# Patient Record
Sex: MALE | Race: WHITE
[De-identification: names, ages, dates, MRNs, and addresses within clinical notes are randomized per-mention and may not be internally consistent; named-entity substitution may affect disease eponyms.]

---

## 2018-07-22 ENCOUNTER — HOSPITAL ENCOUNTER (OUTPATIENT)
Dept: HOSPITAL 7 - FB.SDS | Age: 38
Discharge: HOME | End: 2018-07-22
Payer: COMMERCIAL

## 2018-07-22 ENCOUNTER — HOSPITAL ENCOUNTER (EMERGENCY)
Dept: HOSPITAL 52 - LL.ED | Age: 38
Discharge: NURSING, REHAB OR LONG-TERM CARE | End: 2018-07-22
Payer: COMMERCIAL

## 2018-07-22 VITALS — SYSTOLIC BLOOD PRESSURE: 130 MMHG | DIASTOLIC BLOOD PRESSURE: 83 MMHG

## 2018-07-22 DIAGNOSIS — Z91.010: ICD-10-CM
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
## 2018-07-22 DIAGNOSIS — T18.128A: Primary | ICD-10-CM

## 2018-07-22 DIAGNOSIS — K21.9: ICD-10-CM

## 2018-07-22 DIAGNOSIS — K22.2: ICD-10-CM

## 2018-07-22 DIAGNOSIS — Z90.49: ICD-10-CM

## 2018-07-22 NOTE — EDM.PDOC
ED HPI GENERAL MEDICAL PROBLEM





- General


Chief Complaint: ENT Problem


Stated Complaint: FOREIGN BODY IN THROAT


Time Seen by Provider: 07/22/18 09:55


Source of Information: Reports: Patient


History Limitations: Reports: No Limitations





- History of Present Illness


INITIAL COMMENTS - FREE TEXT/NARRATIVE: 





Patient comes to ER complaining of piece of steak lodged in esophagus since 

supper last night.  Unable to swallow spit/drink water. Tried to vomit it up 

last night but it did not help.  No SOB.  No feeling of airway obstruction.  

Uncomfortable, but no report of sharp pain.  Denies history of similar issues 

in past.


No other complaints. 





- Related Data


 Allergies











Allergy/AdvReac Type Severity Reaction Status Date / Time


 


nut - unspecified Allergy  Anaphylactic Verified 07/22/18 10:04





   Shock  


 


peanut Allergy  Other Verified 10/05/15 19:15











Home Meds: 


 Home Meds





. [No Known Home Meds]  07/22/18 [History]











Past Medical History





- Past Health History


Medical/Surgical History: Denies Medical/Surgical History


HEENT History: Reports: Other (See Below)


Other HEENT History: Hx of foreign body stuck in esophagus


Gastrointestinal History: Reports: GERD, Other (See Below) (Esophageal stricture

)


Genitourinary History: Reports: Other (See Below)


Other Genitourinary History: 1 functioning kidney, 1 removed per pt.





- Past Surgical History


GI Surgical History: Reports: Appendectomy





Social & Family History





- Tobacco Use


Smoking Status *Q: Never Smoker


Second Hand Smoke Exposure: No





- Caffeine Use


Caffeine Use: Reports: Coffee





ED ROS GENERAL





- Review of Systems


Review Of Systems: ROS reveals no pertinent complaints other than HPI.





ED EXAM, GI/ABD





- Physical Exam


Exam: See Below


Exam Limited By: No Limitations


General Appearance: Alert, WD/WN, Other (mildly uncomfortable in appearance but 

no acute distress)


Eyes: Bilateral: Normal Appearance, EOMI


Ears: Normal External Exam


Nose: No: Nasal Deformity, Nasal Swelling, Nasal Drainage


Throat/Mouth: Normal Inspection, Normal Voice, No Airway Compromise


Head: Atraumatic, Normocephalic


Neck: Supple


Respiratory/Chest: No Respiratory Distress, Lungs Clear, Normal Breath Sounds, 

No Accessory Muscle Use


Cardiovascular: Regular Rate, Rhythm


GI/Abdominal Exam: Soft, Non-Tender


 (Male) Exam: Deferred


Rectal (Males) Exam: Deferred


Neurological: Alert, Oriented, Normal Cognition, Normal Gait, No Motor/Sensory 

Deficits


Psychiatric: Normal Affect, Normal Mood


Skin Exam: Warm, Dry, Intact, Normal Color





Course





- Vital Signs


Last Recorded V/S: 


 Last Vital Signs











Temp  36.8 C   07/22/18 09:53


 


Pulse  85   07/22/18 09:53


 


Resp  16   07/22/18 09:53


 


BP  139/93 H  07/22/18 09:53


 


Pulse Ox  97   07/22/18 09:53














- Orders/Labs/Meds


Orders: 


 Active Orders 24 hr











 Category Date Time Status


 


 Chest 2V [CR] Stat Exams  07/22/18 10:03 Ordered














- Radiology Interpretation


Free Text/Narrative:: 





No evidence of esophageal perforation noted on chest xray





- Re-Assessments/Exams


Free Text/Narrative Re-Assessment/Exam: 








 from [facility] was contacted regarding patient.  He 

is willing to see the patient at that facility and can endoscopically address 

the bolus of food stuck in the esophagus as well as look for suspected 

esophageal stricture that caused this event. 





Patient will be driven by his wife directly to [place] after discharge 

from our facility. Vital signs stable. Precautions reviewed prior to discharge. 





Departure





- Departure


Time of Disposition: 10:30


Disposition: DC/Tfer to Acute Hospital [facility]


Condition: Good


Clinical Impression: 


 Esophageal stricture, Esophageal obstruction due to food impaction








- Discharge Information


*PRESCRIPTION DRUG MONITORING PROGRAM REVIEWED*: Not Applicable


*COPY OF PRESCRIPTION DRUG MONITORING REPORT IN PATIENT PRIYANK: Not Applicable


Referrals: 


Ilana Guillermo PA [Primary Care Provider] - 


Forms:  ED Department Discharge


Additional Instructions: 


Drive directly to [facility]. 


Their surgeon, , will meet you there and will continue treatment 

for the piece of food that is stuck in your esophagus. Do not attempt to eat/

drink anything prior to being seen there. 





- My Orders


Last 24 Hours: 


My Active Orders





07/22/18 10:03


Chest 2V [CR] Stat 














- Assessment/Plan


Last 24 Hours: 


My Active Orders





07/22/18 10:03


Chest 2V [CR] Stat

## 2018-07-22 NOTE — PCM.OPNOTE
- General Post-Op/Procedure Note


Date of Surgery/Procedure: 07/22/18


Operative Procedure(s): EGD with removal of esophageal foreign body


Findings: 





Large amount of food (steak) lodged in esophagus 


No definite stricture seen


Pre Op Diagnosis: Foreign body in esophagus


Post-Op Diagnosis: Same


Anesthesia Technique: MAC


Primary Surgeon: Nito Lorenzo


Pathology: 





none


Output, Urine Amount: 0


EBL in mLs: 3


Complications: None


Condition: Good

## 2018-07-22 NOTE — HP
ADMISSION DATE:  07/22/2018

 

HISTORY OF PRESENT ILLNESS:  This is a 37-year-old male who is referred to this

facility today from the [facility].  He presented there with symptoms of

dysphagia after eating steak last night.  He was diagnosed with a foreign body

of the esophagus, which has persisted overnight and he comes here for definitive

management.  He currently is not having any abdominal pain but is unable to

swallow and retain even his saliva or water.  He says he was eating steak at

approximately 8 p.m. last night when he noticed a bite of steak lodged in his

esophagus and has not been able to swallow anything since then.  He denies any

abdominal pain.  He denies any history of heartburn, although does state that

over the last few weeks he has been noticing some increased difficulty with

swallowing larger pieces of bread.  These have all eventually passed and he has

not had to have any previous procedures for any similar symptoms.

 

PAST MEDICAL HISTORY:  Notes that he is generally healthy.

 

MEDICATIONS:  Does not take any routine prescription medications.

 

ALLERGIES:  No known drug allergies.

 

PAST SURGICAL HISTORY:  He has had previous surgeries including a nephrectomy

for a congenital defect.  He has also had appendectomy and wisdom teeth removal.

 

SOCIAL HISTORY:  He denies smoking.

 

FAMILY HISTORY:  Noncontributory.

 

SOCIAL HISTORY:  Patient works as an .  He is .  He lives in

[place].

 

SYSTEM REVIEW:  He denies any recent cough, cold, or sore throat symptoms.  He

has not had any chest pain or palpitations.  No shortness of breath.  He has not

had any difficulty voiding.  He notes an occasional joint pain but no

significant extremity symptoms.

 

PHYSICAL EXAMINATION:  VITAL SIGNS:  Temperature 96.8, pulse 85, blood pressure

is 139/93.  GENERAL:  The patient is an alert, adult male.  He is in no acute

distress.  HEENT:  Head is normocephalic.  No cervical masses.  HEART:  Regular

without murmur.  LUNGS:  Clear.  ABDOMEN:  His abdomen is currently soft.  There

is no palpable mass or distention.  No tenderness.  No hepatic or splenic

enlargement.  EXTREMITIES:  Show no obvious edema or deformity.  NEUROLOGICAL:

He is alert and does not demonstrate any obvious cognitive or neurologic

deficits.

 

IMPRESSION:  Foreign body (steak) in esophagus.

 

PLAN:  EGD with removal of esophageal foreign body.

 

INFORMED CONSENT:  I have discussed the proposed operative procedure with the

patient, reviewed indications, options, and risks such as, but not limited to,

bleeding, infection, and injury to the esophagus.  We also discussed the

possibility of esophageal dilation but will determine the need for that at the

time of the procedure.

 

Job#: [id number]

DD: 07/22/2018 1234

DT: 07/22/2018 1331 WILLIE/CHANCE

## 2018-07-23 NOTE — OR
DATE OF OPERATION:  07/22/2018

 

SURGEON:  Nito Lorenzo MD

 

REFERRING PROVIDER:  Cherise Metcalf MD

 

PREOPERATIVE DIAGNOSIS:  Esophageal foreign body.

 

POSTOPERATIVE DIAGNOSIS:  Esophageal foreign body.

 

OPERATION PERFORMED:  Esophagogastroduodenoscopy with removal of esophageal

foreign body.

 

INDICATIONS FOR SURGERY:  This 37-year-old male was eating steak last night when

it seemed to lodge in his esophagus.  Symptoms of this have continued and he

presents with esophageal obstruction from this.

 

FINDINGS:  In the esophagus, the patient has a large amount of dry, minimally

digested material consistent with steak.  It is firm.  There is a bolus of meat

up throughout much of the lower half of the esophagus.  The esophageal wall

appears somewhat inflamed consistent with his recent history, but I do not see

any intrinsic or extrinsic mass and I do not see any evidence of stricture.  The

wall of the stomach and proximal duodenum appear normal.

 

PROCEDURE IN DETAIL:  The patient was taken to the procedure room.  He was given

intravenous sedation and with him in the left lateral decubitus position, the

esophagus was intubated via a mouth gag.  The scope was carefully advanced down

through the esophagus, and part way down the esophagus, a large bolus of meat

was identified.  The bolus would not readily advance with gentle pressure from

the gastroscope, and so an attempt was made to remove the bolus with a Kwan net.

This was unsuccessful and the bolus was broken up using the raptor grasper

through the gastroscope.  Once it had been broken up enough, that the scope

could slide past.  Scope was able to be advanced through the lower part of the

esophagus and into the stomach.  This did push a portion of the bolus into the

gastric lumen.  Careful inspection did not show any sign of esophageal damage.

On withdrawal of the scope, additional food bolus was identified, and again,

this was removed by partially breaking it up with the raptor grasper and then

being able to push it piecemeal down through the GE junction into the gastric

lumen.  Eventually, with persistent manipulation, the bolus of meat was entirely

cleared from the esophagus which then appeared to be widely patent.  The scope

was advanced down through the stomach and on to the second portion of the

duodenum and these areas appeared normal.  After examining the esophageal wall

at the close of the procedure to assure that there was no sign of injury, the

scope was removed and the patient was taken from the procedure room in

satisfactory condition.

 

ESTIMATED BLOOD LOSS:  3 mL.

 

COMPLICATIONS:  None.

 

PROGNOSIS:  Good.

 

Job#: [id number]

DD: 07/22/2018 1417

DT: 07/22/2018 1526 WILLIE/CHANCE